# Patient Record
Sex: MALE | Race: ASIAN | NOT HISPANIC OR LATINO | URBAN - METROPOLITAN AREA
[De-identification: names, ages, dates, MRNs, and addresses within clinical notes are randomized per-mention and may not be internally consistent; named-entity substitution may affect disease eponyms.]

---

## 2024-07-15 VITALS
HEART RATE: 60 BPM | WEIGHT: 147.93 LBS | DIASTOLIC BLOOD PRESSURE: 64 MMHG | RESPIRATION RATE: 16 BRPM | TEMPERATURE: 98 F | SYSTOLIC BLOOD PRESSURE: 138 MMHG | OXYGEN SATURATION: 96 % | HEIGHT: 64.96 IN

## 2024-07-15 RX ORDER — SODIUM CHLORIDE 0.9 % (FLUSH) 0.9 %
1000 SYRINGE (ML) INJECTION
Refills: 0 | Status: DISCONTINUED | OUTPATIENT
Start: 2024-07-18 | End: 2024-07-19

## 2024-07-15 NOTE — H&P ADULT - NSICDXPASTMEDICALHX_GEN_ALL_CORE_FT
PAST MEDICAL HISTORY:  Coronary artery disease with hx of myocardial infarct w/o hx of CABG     HLD (hyperlipidemia)     HTN (hypertension)     Pacemaker

## 2024-07-15 NOTE — H&P ADULT - NSICDXPASTSURGICALHX_GEN_ALL_CORE_FT
PAST SURGICAL HISTORY:  History of cardiac pacemaker     S/P CABG (coronary artery bypass graft)

## 2024-07-15 NOTE — H&P ADULT - HISTORY OF PRESENT ILLNESS
Cardiologist: Dr. Sohan Weber  Escort:  Pharmacy:    62 y/o M with HTN, HLD, CAD s/p CABG and bradycardia s/p PPM referred by Dr. Chisholm to outpatient cardiologist for initial evaluation of chest pain and PARK. Pt was previously followed by Dr. Pineda. He reports intermittent moderate substernal chest pain described as "pressure", that occurs both at rest and during exertion, and relieves spontaneously after a few minutes. There is no radiation. There is PARK upon climbing incline but ET is limited by back pain and unsteady gait. Pt denies diaphoresis, palpitations, dizziness, syncope, edema, orthopnea. TTE 6/24/24 reveals normal LV systolic function EF 55-60%, grade 1 impaired diastolic dysfunction, mildly reduced RV function, mild MR/TR. Per MD note, NST revealed anterolateral wall ischemia. In light of CCS III anginal symptoms, abnormal NST and prior history of CABG, pt referred for cath with possible PCI if clinically indicated.     Cardiologist: Dr. Sohan Weber  Escort: Daughter   Pharmacy: [facility] ([phone])     62 y/o M with HTN, HLD, CAD s/p CABG and bradycardia s/p PPM referred by Dr. Chisholm to outpatient cardiologist for initial evaluation of chest pain and PARK. Pt was previously followed by Dr. Pineda. He reports intermittent moderate substernal chest pain described as "pressure", that occurs both at rest and during exertion, and relieves spontaneously after a few minutes. There is no radiation. There is PARK upon climbing incline but ET is limited by back pain and unsteady gait. Pt denies diaphoresis, palpitations, dizziness, syncope, edema, orthopnea. TTE 6/24/24 reveals normal LV systolic function EF 55-60%, grade 1 impaired diastolic dysfunction, mildly reduced RV function, mild MR/TR. Per MD note, NST revealed anterolateral wall ischemia. In light of CCS III anginal symptoms, abnormal NST and prior history of CABG, pt referred for cath with possible PCI if clinically indicated.     Cardiologist: Dr. Sohan Weber  Escort: Daughter   Pharmacy: [email or web address] ([phone])     64 y/o M with HTN, HLD, CAD s/p CABG and bradycardia s/p PPM referred by Dr. Chisholm to outpatient cardiologist for initial evaluation of chest pain and PARK. Pt was previously followed by Dr. Pineda. He reports intermittent moderate substernal chest pain described as "pressure", that occurs both at rest and during exertion but aggravated by exertion, and relieves spontaneously after a few minutes. There is no radiation. There is PARK upon climbing incline but ET is limited by back pain and unsteady gait. Pt denies diaphoresis, palpitations, dizziness, syncope, edema, orthopnea. TTE 6/24/24 reveals normal LV systolic function EF 55-60%, grade 1 impaired diastolic dysfunction, mildly reduced RV function, mild MR/TR. Per MD note, NST revealed anterolateral wall ischemia. In light of CCS III anginal symptoms, abnormal NST and prior history of CABG, pt referred for cath with possible PCI if clinically indicated.

## 2024-07-15 NOTE — H&P ADULT - ASSESSMENT
62 y/o M with HTN, HLD, CAD s/p CABG and bradycardia s/p PPM who in light of CCS III anginal symptoms, abnormal NST and prior history of CABG, pt referred for cath with possible PCI if clinically indicated.    		  ASA III	Mallampati class: II    Anginal Class: III    -No Known Allergies    -H/H =   -Pt denies BRBPR, hematuria, hematochezia, melena. Pt endorses compliance w/ home Aspirin, stating last dose was 7/17/24. Pt loaded w/ ASA 81 mg x1 and Plavix 600 mg x1  -BUN/Cr =   -EF 55-60% by echo. Euvolemic on exam. IV NS @ 250cc bolus followed by 75 cc/hr x 2 hrs started pre procedure    Sedation Plan:   Moderate  Patient Is Suitable Candidate For Sedation?     Yes    Risks & benefits of procedure and alternative therapy have been explained to the patient by the Interventional Cardiology Fellow including but not limited to: allergic reaction, bleeding with possible need for blood transfusion, infection, renal and vascular compromise, limb damage, arrhythmia, stroke, vessel dissection/perforation, myocardial infarction, and emergent CABG. Informed consent obtained at bedside and included in chart.

## 2024-07-18 ENCOUNTER — INPATIENT (INPATIENT)
Facility: HOSPITAL | Age: 64
LOS: 0 days | Discharge: HOME | End: 2024-07-19
Attending: INTERNAL MEDICINE | Admitting: INTERNAL MEDICINE
Payer: MEDICAID

## 2024-07-18 DIAGNOSIS — Z95.1 PRESENCE OF AORTOCORONARY BYPASS GRAFT: Chronic | ICD-10-CM

## 2024-07-18 DIAGNOSIS — Z95.0 PRESENCE OF CARDIAC PACEMAKER: Chronic | ICD-10-CM

## 2024-07-18 LAB
A1C WITH ESTIMATED AVERAGE GLUCOSE RESULT: 5.6 % — SIGNIFICANT CHANGE UP (ref 4–5.6)
ALBUMIN SERPL ELPH-MCNC: 4.2 G/DL — SIGNIFICANT CHANGE UP (ref 3.3–5)
ALP SERPL-CCNC: 46 U/L — SIGNIFICANT CHANGE UP (ref 40–120)
ALT FLD-CCNC: 24 U/L — SIGNIFICANT CHANGE UP (ref 10–45)
ANION GAP SERPL CALC-SCNC: 8 MMOL/L — SIGNIFICANT CHANGE UP (ref 5–17)
APTT BLD: 30.6 SEC — SIGNIFICANT CHANGE UP (ref 24.5–35.6)
AST SERPL-CCNC: 20 U/L — SIGNIFICANT CHANGE UP (ref 10–40)
BASE EXCESS BLDV CALC-SCNC: 1.4 MMOL/L — SIGNIFICANT CHANGE UP (ref -2–3)
BASOPHILS # BLD AUTO: 0.04 K/UL — SIGNIFICANT CHANGE UP (ref 0–0.2)
BASOPHILS NFR BLD AUTO: 0.6 % — SIGNIFICANT CHANGE UP (ref 0–2)
BILIRUB SERPL-MCNC: 0.5 MG/DL — SIGNIFICANT CHANGE UP (ref 0.2–1.2)
BLOOD GAS VENOUS - BLOOD UREA NITROGEN: 17 MG/DL — SIGNIFICANT CHANGE UP (ref 7–23)
BLOOD GAS VENOUS - CREATININE: 0.7 MG/DL — SIGNIFICANT CHANGE UP (ref 0.5–1.3)
BUN SERPL-MCNC: 16 MG/DL — SIGNIFICANT CHANGE UP (ref 7–23)
CA-I SERPL-SCNC: 1.2 MMOL/L — SIGNIFICANT CHANGE UP (ref 1.15–1.33)
CA-I SERPL-SCNC: 1.21 MMOL/L — SIGNIFICANT CHANGE UP (ref 1.15–1.33)
CALCIUM SERPL-MCNC: 9 MG/DL — SIGNIFICANT CHANGE UP (ref 8.4–10.5)
CHLORIDE BLDV-SCNC: 106 MMOL/L — SIGNIFICANT CHANGE UP (ref 96–108)
CHLORIDE SERPL-SCNC: 108 MMOL/L — SIGNIFICANT CHANGE UP (ref 96–108)
CHOLEST SERPL-MCNC: 110 MG/DL — SIGNIFICANT CHANGE UP
CK MB CFR SERPL CALC: 1.8 NG/ML — SIGNIFICANT CHANGE UP (ref 0–6.7)
CK SERPL-CCNC: 127 U/L — SIGNIFICANT CHANGE UP (ref 30–200)
CO2 BLDV-SCNC: 23.9 MMOL/L — SIGNIFICANT CHANGE UP (ref 22–26)
CO2 BLDV-SCNC: 27.9 MMOL/L — HIGH (ref 22–26)
CO2 SERPL-SCNC: 25 MMOL/L — SIGNIFICANT CHANGE UP (ref 22–31)
COHGB MFR BLDV: 1.8 % — SIGNIFICANT CHANGE UP
CREAT SERPL-MCNC: 0.74 MG/DL — SIGNIFICANT CHANGE UP (ref 0.5–1.3)
EGFR: 101 ML/MIN/1.73M2 — SIGNIFICANT CHANGE UP
EOSINOPHIL # BLD AUTO: 0.12 K/UL — SIGNIFICANT CHANGE UP (ref 0–0.5)
EOSINOPHIL NFR BLD AUTO: 1.8 % — SIGNIFICANT CHANGE UP (ref 0–6)
ESTIMATED AVERAGE GLUCOSE: 114 MG/DL — SIGNIFICANT CHANGE UP (ref 68–114)
GAS PNL BLDV: 138 MMOL/L — SIGNIFICANT CHANGE UP (ref 136–145)
GAS PNL BLDV: 140 MMOL/L — SIGNIFICANT CHANGE UP (ref 136–145)
GLUCOSE BLDV-MCNC: 100 MG/DL — HIGH (ref 70–99)
GLUCOSE BLDV-MCNC: 96 MG/DL — SIGNIFICANT CHANGE UP (ref 70–99)
GLUCOSE SERPL-MCNC: 102 MG/DL — HIGH (ref 70–99)
HCO3 BLDV-SCNC: 27 MMOL/L — SIGNIFICANT CHANGE UP (ref 22–29)
HCT VFR BLD CALC: 40.7 % — SIGNIFICANT CHANGE UP (ref 39–50)
HCT VFR BLDA CALC: 39 % — SIGNIFICANT CHANGE UP
HCT VFR BLDA CALC: 42 % — SIGNIFICANT CHANGE UP
HDLC SERPL-MCNC: 41 MG/DL — SIGNIFICANT CHANGE UP
HGB BLD CALC-MCNC: 14 G/DL — SIGNIFICANT CHANGE UP (ref 12.6–17.4)
HGB BLD-MCNC: 13.5 G/DL — SIGNIFICANT CHANGE UP (ref 13–17)
IMM GRANULOCYTES NFR BLD AUTO: 0.1 % — SIGNIFICANT CHANGE UP (ref 0–0.9)
INR BLD: 0.9 — SIGNIFICANT CHANGE UP (ref 0.85–1.18)
LACTATE BLDV-MCNC: 1.2 MMOL/L — SIGNIFICANT CHANGE UP (ref 0.5–2)
LIPID PNL WITH DIRECT LDL SERPL: 46 MG/DL — SIGNIFICANT CHANGE UP
LYMPHOCYTES # BLD AUTO: 1.79 K/UL — SIGNIFICANT CHANGE UP (ref 1–3.3)
LYMPHOCYTES # BLD AUTO: 26.5 % — SIGNIFICANT CHANGE UP (ref 13–44)
MAGNESIUM SERPL-MCNC: 2.2 MG/DL — SIGNIFICANT CHANGE UP (ref 1.6–2.6)
MCHC RBC-ENTMCNC: 30.9 PG — SIGNIFICANT CHANGE UP (ref 27–34)
MCHC RBC-ENTMCNC: 33.2 GM/DL — SIGNIFICANT CHANGE UP (ref 32–36)
MCV RBC AUTO: 93.1 FL — SIGNIFICANT CHANGE UP (ref 80–100)
METHGB MFR BLDV: 1.1 % — SIGNIFICANT CHANGE UP
MONOCYTES # BLD AUTO: 0.56 K/UL — SIGNIFICANT CHANGE UP (ref 0–0.9)
MONOCYTES NFR BLD AUTO: 8.3 % — SIGNIFICANT CHANGE UP (ref 2–14)
NEUTROPHILS # BLD AUTO: 4.23 K/UL — SIGNIFICANT CHANGE UP (ref 1.8–7.4)
NEUTROPHILS NFR BLD AUTO: 62.7 % — SIGNIFICANT CHANGE UP (ref 43–77)
NON HDL CHOLESTEROL: 69 MG/DL — SIGNIFICANT CHANGE UP
NRBC # BLD: 0 /100 WBCS — SIGNIFICANT CHANGE UP (ref 0–0)
PCO2 BLDV: 43 MMHG — SIGNIFICANT CHANGE UP (ref 42–55)
PCO2 BLDV: 48 MMHG — SIGNIFICANT CHANGE UP (ref 42–55)
PH BLDV: 7.37 — SIGNIFICANT CHANGE UP (ref 7.32–7.43)
PH BLDV: 7.4 — SIGNIFICANT CHANGE UP (ref 7.32–7.43)
PLATELET # BLD AUTO: 173 K/UL — SIGNIFICANT CHANGE UP (ref 150–400)
PO2 BLDV: 47 MMHG — HIGH (ref 25–45)
POTASSIUM BLDV-SCNC: 4.3 MMOL/L — SIGNIFICANT CHANGE UP (ref 3.5–5.1)
POTASSIUM BLDV-SCNC: 4.4 MMOL/L — SIGNIFICANT CHANGE UP (ref 3.5–5.1)
POTASSIUM SERPL-MCNC: 4 MMOL/L — SIGNIFICANT CHANGE UP (ref 3.5–5.3)
POTASSIUM SERPL-SCNC: 4 MMOL/L — SIGNIFICANT CHANGE UP (ref 3.5–5.3)
PROT SERPL-MCNC: 7.2 G/DL — SIGNIFICANT CHANGE UP (ref 6–8.3)
PROTHROM AB SERPL-ACNC: 10.3 SEC — SIGNIFICANT CHANGE UP (ref 9.5–13)
RBC # BLD: 4.37 M/UL — SIGNIFICANT CHANGE UP (ref 4.2–5.8)
RBC # FLD: 13 % — SIGNIFICANT CHANGE UP (ref 10.3–14.5)
SAO2 % BLDV: 81 % — SIGNIFICANT CHANGE UP (ref 67–88)
SODIUM SERPL-SCNC: 141 MMOL/L — SIGNIFICANT CHANGE UP (ref 135–145)
TRIGL SERPL-MCNC: 134 MG/DL — SIGNIFICANT CHANGE UP
WBC # BLD: 6.75 K/UL — SIGNIFICANT CHANGE UP (ref 3.8–10.5)
WBC # FLD AUTO: 6.75 K/UL — SIGNIFICANT CHANGE UP (ref 3.8–10.5)

## 2024-07-18 PROCEDURE — 93010 ELECTROCARDIOGRAM REPORT: CPT

## 2024-07-18 RX ORDER — ALLOPURINOL 300 MG/1
50 TABLET ORAL EVERY 8 HOURS
Refills: 0 | Status: DISCONTINUED | OUTPATIENT
Start: 2024-07-18 | End: 2024-07-19

## 2024-07-18 RX ORDER — SODIUM CHLORIDE 0.9 % (FLUSH) 0.9 %
250 SYRINGE (ML) INJECTION ONCE
Refills: 0 | Status: COMPLETED | OUTPATIENT
Start: 2024-07-18 | End: 2024-07-18

## 2024-07-18 RX ORDER — LOSARTAN POTASSIUM 100 MG/1
1 TABLET, FILM COATED ORAL
Refills: 0 | DISCHARGE

## 2024-07-18 RX ORDER — ATORVASTATIN CALCIUM 20 MG/1
80 TABLET, FILM COATED ORAL AT BEDTIME
Refills: 0 | Status: DISCONTINUED | OUTPATIENT
Start: 2024-07-18 | End: 2024-07-19

## 2024-07-18 RX ORDER — ISOSORBIDE MONONITRATE 30 MG/1
30 TABLET, EXTENDED RELEASE ORAL DAILY
Refills: 0 | Status: DISCONTINUED | OUTPATIENT
Start: 2024-07-18 | End: 2024-07-19

## 2024-07-18 RX ORDER — NITROGLYCERIN 2.5 MG
1 CAPSULE, EXTENDED RELEASE ORAL
Refills: 0 | DISCHARGE

## 2024-07-18 RX ORDER — TAMSULOSIN HYDROCHLORIDE 0.4 MG/1
1 CAPSULE ORAL
Refills: 0 | DISCHARGE

## 2024-07-18 RX ORDER — METOPROLOL TARTRATE 50 MG
25 TABLET ORAL DAILY
Refills: 0 | Status: DISCONTINUED | OUTPATIENT
Start: 2024-07-18 | End: 2024-07-19

## 2024-07-18 RX ORDER — SODIUM CHLORIDE 0.9 % (FLUSH) 0.9 %
1000 SYRINGE (ML) INJECTION
Refills: 0 | Status: DISCONTINUED | OUTPATIENT
Start: 2024-07-18 | End: 2024-07-19

## 2024-07-18 RX ORDER — ASPIRIN 325 MG/1
81 TABLET, FILM COATED ORAL DAILY
Refills: 0 | Status: DISCONTINUED | OUTPATIENT
Start: 2024-07-18 | End: 2024-07-19

## 2024-07-18 RX ORDER — CLOPIDOGREL BISULFATE 75 MG/1
600 TABLET, FILM COATED ORAL ONCE
Refills: 0 | Status: COMPLETED | OUTPATIENT
Start: 2024-07-18 | End: 2024-07-18

## 2024-07-18 RX ORDER — ALLOPURINOL 300 MG/1
0.5 TABLET ORAL
Refills: 0 | DISCHARGE

## 2024-07-18 RX ORDER — LOSARTAN POTASSIUM 100 MG/1
25 TABLET, FILM COATED ORAL DAILY
Refills: 0 | Status: DISCONTINUED | OUTPATIENT
Start: 2024-07-18 | End: 2024-07-19

## 2024-07-18 RX ORDER — METOPROLOL TARTRATE 50 MG
1 TABLET ORAL
Refills: 0 | DISCHARGE

## 2024-07-18 RX ORDER — ISOSORBIDE MONONITRATE 30 MG/1
1 TABLET, EXTENDED RELEASE ORAL
Refills: 0 | DISCHARGE

## 2024-07-18 RX ORDER — ROSUVASTATIN CALCIUM 20 MG/1
1 TABLET ORAL
Refills: 0 | DISCHARGE

## 2024-07-18 RX ORDER — ASPIRIN 325 MG/1
81 TABLET, FILM COATED ORAL ONCE
Refills: 0 | Status: COMPLETED | OUTPATIENT
Start: 2024-07-18 | End: 2024-07-18

## 2024-07-18 RX ORDER — CLOPIDOGREL BISULFATE 75 MG/1
75 TABLET, FILM COATED ORAL DAILY
Refills: 0 | Status: DISCONTINUED | OUTPATIENT
Start: 2024-07-19 | End: 2024-07-19

## 2024-07-18 RX ORDER — RANOLAZINE 500 MG/1
500 TABLET, EXTENDED RELEASE ORAL
Refills: 0 | DISCHARGE

## 2024-07-18 RX ADMIN — ASPIRIN 81 MILLIGRAM(S): 325 TABLET, FILM COATED ORAL at 07:59

## 2024-07-18 RX ADMIN — ASPIRIN 81 MILLIGRAM(S): 325 TABLET, FILM COATED ORAL at 17:59

## 2024-07-18 RX ADMIN — Medication 75 MILLILITER(S): at 07:59

## 2024-07-18 RX ADMIN — Medication 1000 MILLILITER(S): at 07:59

## 2024-07-18 RX ADMIN — ALLOPURINOL 50 MILLIGRAM(S): 300 TABLET ORAL at 17:59

## 2024-07-18 RX ADMIN — ATORVASTATIN CALCIUM 80 MILLIGRAM(S): 20 TABLET, FILM COATED ORAL at 21:31

## 2024-07-18 RX ADMIN — ISOSORBIDE MONONITRATE 30 MILLIGRAM(S): 30 TABLET, EXTENDED RELEASE ORAL at 17:59

## 2024-07-18 RX ADMIN — CLOPIDOGREL BISULFATE 600 MILLIGRAM(S): 75 TABLET, FILM COATED ORAL at 07:59

## 2024-07-18 RX ADMIN — ALLOPURINOL 50 MILLIGRAM(S): 300 TABLET ORAL at 21:32

## 2024-07-18 RX ADMIN — Medication 210 MILLILITER(S): at 11:43

## 2024-07-18 NOTE — DISCHARGE NOTE PROVIDER - ATTENDING DISCHARGE PHYSICAL EXAMINATION:
CAD  s/p [facility] 7/18/24: dLM 50%, pLAD 70-80%, Ramus 80%m oLCx 90%, mLCx 80%, eAGL68-32%, mRCA 30-40%, LIMA-LAD patent, SVG-RI patent s/p IVUS guided DESx3 dLM, pLCx and mLCx. No EDP. EF 55-60% , Access - RFA Perclose. Pt will c/w ASA 81mg qd, Plavix 75mg qd, Crestor 20mg qd. LDL 46.   Follow up with cardiologist Dr. Weber in 1-2 weeks.

## 2024-07-18 NOTE — DISCHARGE NOTE PROVIDER - CARE PROVIDER_API CALL
Norma Weber  Cardiology  [address] [place] [address]  Phone: [phone]  Fax: [phone]  Established Patient  Follow Up Time: 1 week   Norma Weber  Cardiologist  [address] [place] [address]  Phone: [phone]  Fax: (   )    -  Established Patient  Follow Up Time: 1 week

## 2024-07-18 NOTE — DISCHARGE NOTE PROVIDER - NSDCCPCAREPLAN_GEN_ALL_CORE_FT
PRINCIPAL DISCHARGE DIAGNOSIS  Diagnosis: CAD (coronary artery disease)  Assessment and Plan of Treatment: You were found to have blockages in the arteries of your heart, also known as Coronary Artery Disease. You underwent a cardiac catheterization on 7/18/24 and received a stent to the Left main, left Circumflex arteries.  PLEASE CONTINUE ASPIRIN 81MG DAILY AND PLAVIX 75MG DAILY. DO NOT STOP THESE MEDICATIONS FOR ANY REASON AS THEY ARE KEEPING YOUR STENT OPEN AND PREVENTING A HEART ATTACK.   We have provided you with a prescription for cardiac rehab which is medically supervised exercise program for your heart and has been shown to improve the quantity and quality of life of people with heart disease like yours. You should attend cardiac rehab 3 times per week for 12 weeks. We have provided you with a list of nearby facilities. Please call your insurance carrier to determine which of these facilities are covered under your plan.  Avoid strenuous activity or heavy lifting anything more than 5lbs for the next five days. Do not take a bath or swim for the next five days; you may shower. For any bleeding or hematoma formation (hardened blood collection under the skin) at the access site of your right radial access please hold pressure and go to the emergency room. Please follow up with Dr. Weber in 1-2 weeks. For recurrent chest pain, please call your doctor or go to the emergency room.        SECONDARY DISCHARGE DIAGNOSES  Diagnosis: HTN (hypertension)  Assessment and Plan of Treatment: Please continue Imdur 30mg, Losartan 25mg, metoprolol succinate 25mg as listed to keep your blood pressure controlled. For blood pressure that is too high or too low please see your doctor or go to the emergency room as necessary.      Diagnosis: HLD (hyperlipidemia)  Assessment and Plan of Treatment: Please continue Crestor 20mg at bedtime to keep your cholesterol low. High cholesterol contributes to heart disease.       PRINCIPAL DISCHARGE DIAGNOSIS  Diagnosis: CAD (coronary artery disease)  Assessment and Plan of Treatment: You were found to have blockages in the arteries of your heart, also known as Coronary Artery Disease. You underwent a cardiac catheterization on 7/18/24 and received 3 total cardiac stents to the Left Main and Left Circumflex Arteries. You will need to take antiplatelet medications Asprin and Plavix daily to help the cardiac stent keep open. DO NOT STOP taking these medications unless directed by your cardiologist as this can be LIFE THREATENING and lead to closing up of the cardiac stent and lead to a heart attack!  The possible side effects of these medications include increased risk of bleeding and easy bruising. However, the benefits of preventing stent closure are greater than this risk, so you are advised to take these medications especially after stent placement.      SECONDARY DISCHARGE DIAGNOSES  Diagnosis: HLD (hyperlipidemia)  Assessment and Plan of Treatment: Please continue Crestor 20mg at bedtime to keep your cholesterol low. High cholesterol contributes to heart disease.    Diagnosis: Encounter for cardiac rehabilitation  Assessment and Plan of Treatment: It is recommended for you to go to cardiac rehabilitation, which is outpatient physical therapy tailored to improve the heart.  It has been shown to improve the quantity and quality of life of people with heart disease like yours. You should attend cardiac rehab 3 times per week for 12 weeks.  You were provided a prescription a list of nearby facilities. Please call your insurance carrier to determine which of these facilities are covered under your plan.   -Please bring this prescription with you to your follow up appointment with your cardiologist who can then further assist you to enroll into a cardiac rehab program.

## 2024-07-18 NOTE — DISCHARGE NOTE PROVIDER - PROVIDER TOKENS
PROVIDER:[TOKEN:[[id number]],FOLLOWUP:[1 week],ESTABLISHEDPATIENT:[T]] FREE:[LAST:[Weber],FIRST:[JanieKaushikArmando],PHONE:[phone]],FAX:[(   )    -],ADDRESS:[Cardiologist  [address] [place] [address]],FOLLOWUP:[1 week],ESTABLISHEDPATIENT:[T]]

## 2024-07-18 NOTE — DISCHARGE NOTE PROVIDER - HOSPITAL COURSE
64 y/o M with HTN, HLD, CAD s/p CABG and bradycardia s/p PPM referred by Dr. Chisholm to outpatient cardiologist for initial evaluation of chest pain and PARK. Pt was previously followed by Dr. Pineda. He reports intermittent moderate substernal chest pain described as "pressure", that occurs both at rest and during exertion but aggravated by exertion, and relieves spontaneously after a few minutes. There is no radiation. There is PARK upon climbing incline but ET is limited by back pain and unsteady gait. Pt denies diaphoresis, palpitations, dizziness, syncope, edema, orthopnea. TTE 6/24/24 reveals normal LV systolic function EF 55-60%, grade 1 impaired diastolic dysfunction, mildly reduced RV function, mild MR/TR. Per MD note, NST revealed anterolateral wall ischemia. In light of CCS III anginal symptoms, abnormal NST and prior history of CABG, pt referred for cath with possible PCI if clinically indicated.    Patient is now s/p [facility] 7/18/24: dLM 50%, pLAD 70-80%, Ramus 80%m oLCx 90%, mLCx 80%, mHKO04-99%, mRCA 30-40%, LIMA-LAD patent, SVG-RI patent s/p IVUS guided DESx3 dLM, pLCx and mLCx. No EDP. EF 55-60% , Access - RFA PC.                Cardiac Rehab Indications (STEMI/NSTEMI/ACS/Unstable Angina/CHF/Chronic Stable Angina/Heart Surgery (CABG,Valve)/Post PCI):            *Education on benefits of Cardiac Rehab provided to patient: Yes         *Referral and Prescription Given for Cardiac Rehab: Yes         *Pt given list of locations & instructed to contact their insurance company to review list of participating providers. Yes          *Pt instructed to bring Cardiac Rehab prescription with them to Cardiology Follow up appointment for assistance with enrollment: Yes         *Pt discharge copies detail cardiovascular history, medications, testing/treatments OR pt has created a patient portal account and instructed to provider their records at their 1st appointment: Yes    -Beta Blocker Prescribed: Yes   -ACE-I/ARB Prescribed: Yes  -Statin Prescribed (STEMI/NSTEMI/ACS/UA &/OR Post PCI this admission):  Yes  -DAPT Post PCI: Prescriptions for Aspirin/Plavix e-prescribed to patient's pharmacy: Yes  -CVD ( (STEMI/NSTEMI/ACS/UA &/OR Post PCI this admission): GLP-1 receptor agonist, SGLT2 inhibitor meds discussed w/ patients and encouraged to discuss further with PMD or endo at next visit: Yes    Pt is asymptomatic at this time and denies chest pain, SOB, PARK, palpitations, dizziness, LOC, N/V, diaphoresis, orthopnea/PND, and leg swelling. Pt able to ambulate and void without complication. VSS. Labs and telemetry reviewed. Right radial access site stable and dressing C/D/I.  Pt is a candidate for discharge per Dr. Rangel. Pt given appropriate discharge instructions, pt states they have an appropriate amount of their previous home meds unchanged from this visit at home, and any new medications were sent to their pharmacy. Pt instructed to f/u with Dr. Weber in 1-2 weeks.      DISCHARGE MEDS:   -ASA 81mg   -plavix 75mg   -Crestor 20mg    64 y/o M with HTN, HLD, CAD s/p CABG and bradycardia s/p PPM, referred to outpatient cardiologist for initial evaluation of chest pain and PARK. He reports intermittent moderate substernal chest pain described as "pressure", that occurs both at rest and during exertion but aggravated by exertion, and relieves spontaneously after a few minutes. Reports PARK upon climbing incline, but ET is limited by back pain and unsteady gait. TTE 6/24/24 reveals normal LV systolic function EF 55-60%, grade 1 impaired diastolic dysfunction, mildly reduced RV function, mild MR/TR. Per MD note, NST revealed anterolateral wall ischemia. In light of CCS III anginal symptoms, abnormal NST and prior history of CABG, pt referred for cardiac cath.    Patient is now s/p [facility] 7/18/24: dLM 50%, pLAD 70-80%, Ramus 80%m oLCx 90%, mLCx 80%, tUNL23-12%, mRCA 30-40%, LIMA-LAD patent, SVG-RI patent s/p IVUS guided DESx3 dLM, pLCx and mLCx. No EDP. EF 55-60% , Access - RFA Perclose. Pt will c/w ASA 81mg qd, Plavix 75mg qd, Crestor 20mg qd. LDL 46.     Pt admitted overnight for observation and telemetry monitoring. Pt seen and examined at bedside on day of discharge without any complaints or events overnight. VSS, labs and telemetry reviewed and pt stable for discharge. Pt has received appropriate discharge instructions, including medication regimen, access site management and follow up with cardiologist Dr. Weber in 1-2 weeks.    Referred for Cardiac Rehab (CAD Post PCI): Education on benefits of Cardiac Rehab provided to patient. Referral and Prescription Given for Cardiac Rehab. Pt given list of locations & instructed to contact their insurance company to review list of participating providers. Pt instructed to bring Cardiac Rehab prescription with them to Cardiology Follow up appointment for assistance with enrollment.  GLP-1 receptor agonist/SGLT2 inhibitor meds discussed w/ patients and encouraged to discuss further with PMD or Endocrinologist at next visit.    Pt discharge copies detail cardiovascular history, medications, testing/treatments, OR patient has created a patient portal account and instructed to provide their records at their 1st appointment.

## 2024-07-18 NOTE — DISCHARGE NOTE PROVIDER - NSDCMRMEDTOKEN_GEN_ALL_CORE_FT
allopurinol 100 mg oral tablet: 0.5 orally every 8 hours  aspirin 81 mg oral capsule: 1 cap(s) orally once a day  Crestor 20 mg oral tablet: 1 tab(s) orally once a day  Imdur 30 mg oral tablet, extended release: 1 tab(s) orally once a day  losartan 25 mg oral tablet: 1 tab(s) orally once a day  metoprolol succinate 25 mg oral tablet, extended release: 1 tab(s) orally once a day  nitroglycerin 0.4 mg sublingual tablet: 1 tab(s) sublingually as needed for  chest pain   allopurinol 100 mg oral tablet: 0.5 orally every 8 hours  aspirin 81 mg oral delayed release tablet: 1 tab(s) orally once a day  Cardiac rehabilitation: 3 times a week for 12 weeks. Dx CAD s/p PCI. Outpatient cardiologist Dr Norma Weber  clopidogrel 75 mg oral tablet: 1 tab(s) orally once a day  Crestor 20 mg oral tablet: 1 tab(s) orally once a day  Imdur 30 mg oral tablet, extended release: 1 tab(s) orally once a day  losartan 25 mg oral tablet: 1 tab(s) orally once a day  metoprolol succinate 25 mg oral tablet, extended release: 1 tab(s) orally once a day  nitroglycerin 0.4 mg sublingual tablet: 1 tab(s) sublingually as needed for  chest pain

## 2024-07-18 NOTE — DISCHARGE NOTE PROVIDER - NSDCFUADDINST_GEN_ALL_CORE_FT
ACTIVITY:     Do not drive or operate hazardous machinery for 24 hours.                           Limit your physical activities for 24 hours.                           Do not engage in in sports, heavy work or heavy lifting for 72 hours.           DIET:             You may resume your regular diet.                           Drinking extra fluids (water, juice) is encouraged.                           Abstain from alcohol for 24 hours.           HYGIENE:     Shower and take off the puncture site dressing the next morning.                           If you received a "closure device" in your groin, you may shower the next day, but not take a bath, hot tub or swim for 5  days.           PAIN MEDICATION:   A mild pain at the puncture site is not unusual.                                           You may take Tylenol 1-2 tabs every 4-6 hours as needed, for one day.                                           If the pain persists contact the office at [phone]           SPECIAL INSTRUCTIONS:     Signs and symptoms to look out for:              1. Waldemar bleeding from the puncture site is an emergency.               Put direct pressure on the site and go directly to your local Emergency   Room for treatment.              2. Bleeding under the skin may also occur and a small "black and blue may be expected.            If there appears to be an expanding mass or swelling around the puncture site, apply manual compression and go             immediately to your local Emergency Room for treatment.              3. If your foot/leg or hand/arm (puncture site) becomes cool or blue and/or you are unable to move it, this must be treated as an   emergency.            go directly to your local emergency room for treatment.              4. Excessive puncture site pain is abnormal and should be assessed.             5.  Look out for signs of infection in the puncture site: fever, red streaking of the leg, discharge, pain.             6.  Lack of adequate urine output, provided you are drinking enough fluids, may be cause for concern, notify us if you think this is the case.        - Do NOT drive or operate hazardous machinery for 24 hours. Limit your physical activity for 24-48 hours. Do NOT engage in sports, heavy work or heavy lifting more than 5 lbs for 5 days.   - You MAY shower and wash the area gently with soap and water. BUT no TUB BATHS, HOT TUBS OR SWIMMING FOR 5 DAYS.  Do not keep the area covered. Do not put any lotions, creams, or ointments on the site.  - Your procedure was done through your right groin. If you observe flank bleeding from the puncture site, it is an emergency. Please put direct pressure on the site and go directly to the ER. Bleeding under the skin may also occur and a small "black and blue" may be expected. If the area appears to be expanding or swelling around the puncture site, apply manual compression and go immediately to the nearest ER. If your leg/foot becomes cool or blue and/or you are unable to move it, this must be treated as an emergency, go directly to the nearest ER. Look for signs of infection in the groin: fever, red streaking of the leg, obvious pus formation and pain.  -If you have any issues or concerns regarding your access site, you may call [facility] Interventional Cardiology at [phone].

## 2024-07-19 VITALS
HEART RATE: 60 BPM | DIASTOLIC BLOOD PRESSURE: 61 MMHG | RESPIRATION RATE: 18 BRPM | SYSTOLIC BLOOD PRESSURE: 132 MMHG | OXYGEN SATURATION: 94 %

## 2024-07-19 LAB
ANION GAP SERPL CALC-SCNC: 9 MMOL/L — SIGNIFICANT CHANGE UP (ref 5–17)
BUN SERPL-MCNC: 16 MG/DL — SIGNIFICANT CHANGE UP (ref 7–23)
CALCIUM SERPL-MCNC: 8.5 MG/DL — SIGNIFICANT CHANGE UP (ref 8.4–10.5)
CHLORIDE SERPL-SCNC: 104 MMOL/L — SIGNIFICANT CHANGE UP (ref 96–108)
CO2 SERPL-SCNC: 24 MMOL/L — SIGNIFICANT CHANGE UP (ref 22–31)
CREAT SERPL-MCNC: 0.71 MG/DL — SIGNIFICANT CHANGE UP (ref 0.5–1.3)
EGFR: 102 ML/MIN/1.73M2 — SIGNIFICANT CHANGE UP
GLUCOSE SERPL-MCNC: 106 MG/DL — HIGH (ref 70–99)
HCT VFR BLD CALC: 38.3 % — LOW (ref 39–50)
HGB BLD-MCNC: 12.8 G/DL — LOW (ref 13–17)
ISTAT ACTK (ACTIVATED CLOTTING TIME KAOLIN): 195 SEC — HIGH (ref 74–137)
ISTAT ACTK (ACTIVATED CLOTTING TIME KAOLIN): 287 SEC — HIGH (ref 74–137)
ISTAT ACTK (ACTIVATED CLOTTING TIME KAOLIN): 299 SEC — HIGH (ref 74–137)
MAGNESIUM SERPL-MCNC: 2.1 MG/DL — SIGNIFICANT CHANGE UP (ref 1.6–2.6)
MCHC RBC-ENTMCNC: 30.6 PG — SIGNIFICANT CHANGE UP (ref 27–34)
MCHC RBC-ENTMCNC: 33.4 GM/DL — SIGNIFICANT CHANGE UP (ref 32–36)
MCV RBC AUTO: 91.6 FL — SIGNIFICANT CHANGE UP (ref 80–100)
NRBC # BLD: 0 /100 WBCS — SIGNIFICANT CHANGE UP (ref 0–0)
PLATELET # BLD AUTO: 156 K/UL — SIGNIFICANT CHANGE UP (ref 150–400)
POTASSIUM SERPL-MCNC: 3.9 MMOL/L — SIGNIFICANT CHANGE UP (ref 3.5–5.3)
POTASSIUM SERPL-SCNC: 3.9 MMOL/L — SIGNIFICANT CHANGE UP (ref 3.5–5.3)
RBC # BLD: 4.18 M/UL — LOW (ref 4.2–5.8)
RBC # FLD: 12.7 % — SIGNIFICANT CHANGE UP (ref 10.3–14.5)
SODIUM SERPL-SCNC: 137 MMOL/L — SIGNIFICANT CHANGE UP (ref 135–145)
WBC # BLD: 9.51 K/UL — SIGNIFICANT CHANGE UP (ref 3.8–10.5)
WBC # FLD AUTO: 9.51 K/UL — SIGNIFICANT CHANGE UP (ref 3.8–10.5)

## 2024-07-19 PROCEDURE — 99239 HOSP IP/OBS DSCHRG MGMT >30: CPT

## 2024-07-19 RX ORDER — POTASSIUM CHLORIDE 600 MG/1
20 TABLET, FILM COATED, EXTENDED RELEASE ORAL ONCE
Refills: 0 | Status: COMPLETED | OUTPATIENT
Start: 2024-07-19 | End: 2024-07-19

## 2024-07-19 RX ORDER — CLOPIDOGREL BISULFATE 75 MG/1
1 TABLET, FILM COATED ORAL
Qty: 30 | Refills: 11
Start: 2024-07-19 | End: 2025-07-13

## 2024-07-19 RX ORDER — ASPIRIN 325 MG/1
1 TABLET, FILM COATED ORAL
Qty: 30 | Refills: 11
Start: 2024-07-19 | End: 2025-07-13

## 2024-07-19 RX ORDER — ASPIRIN 325 MG/1
1 TABLET, FILM COATED ORAL
Refills: 0 | DISCHARGE

## 2024-07-19 RX ADMIN — ISOSORBIDE MONONITRATE 30 MILLIGRAM(S): 30 TABLET, EXTENDED RELEASE ORAL at 09:50

## 2024-07-19 RX ADMIN — CLOPIDOGREL BISULFATE 75 MILLIGRAM(S): 75 TABLET, FILM COATED ORAL at 09:50

## 2024-07-19 RX ADMIN — Medication 25 MILLIGRAM(S): at 05:28

## 2024-07-19 RX ADMIN — ASPIRIN 81 MILLIGRAM(S): 325 TABLET, FILM COATED ORAL at 09:50

## 2024-07-19 RX ADMIN — ALLOPURINOL 50 MILLIGRAM(S): 300 TABLET ORAL at 05:28

## 2024-07-19 RX ADMIN — LOSARTAN POTASSIUM 25 MILLIGRAM(S): 100 TABLET, FILM COATED ORAL at 05:28

## 2024-07-19 RX ADMIN — POTASSIUM CHLORIDE 20 MILLIEQUIVALENT(S): 600 TABLET, FILM COATED, EXTENDED RELEASE ORAL at 09:50

## 2024-07-19 NOTE — DISCHARGE NOTE NURSING/CASE MANAGEMENT/SOCIAL WORK - NSDCPEFALRISK_GEN_ALL_CORE
For information on Fall & Injury Prevention, visit: [email or web address] OR  [email or web address] OR  https://www.cdc.gov/steadi/patient.html

## 2024-07-19 NOTE — DISCHARGE NOTE NURSING/CASE MANAGEMENT/SOCIAL WORK - PATIENT PORTAL LINK FT
You can access the FollowMyHealth Patient Portal offered by [facility] by registering at the following website: [email or web address]. By joining [employer]’s FollowMyHealth portal, you will also be able to view your health information using other applications (apps) compatible with our system.

## 2024-08-22 PROCEDURE — 85347 COAGULATION TIME ACTIVATED: CPT

## 2024-08-22 PROCEDURE — 93005 ELECTROCARDIOGRAM TRACING: CPT

## 2024-08-22 PROCEDURE — 85027 COMPLETE CBC AUTOMATED: CPT

## 2024-08-22 PROCEDURE — 82803 BLOOD GASES ANY COMBINATION: CPT

## 2024-08-22 PROCEDURE — C1760: CPT

## 2024-08-22 PROCEDURE — C1753: CPT

## 2024-08-22 PROCEDURE — 85730 THROMBOPLASTIN TIME PARTIAL: CPT

## 2024-08-22 PROCEDURE — C1769: CPT

## 2024-08-22 PROCEDURE — 85610 PROTHROMBIN TIME: CPT

## 2024-08-22 PROCEDURE — 36415 COLL VENOUS BLD VENIPUNCTURE: CPT

## 2024-08-22 PROCEDURE — 80061 LIPID PANEL: CPT

## 2024-08-22 PROCEDURE — 83036 HEMOGLOBIN GLYCOSYLATED A1C: CPT

## 2024-08-22 PROCEDURE — 82553 CREATINE MB FRACTION: CPT

## 2024-08-22 PROCEDURE — C1725: CPT

## 2024-08-22 PROCEDURE — C1874: CPT

## 2024-08-22 PROCEDURE — C1894: CPT

## 2024-08-22 PROCEDURE — C1887: CPT

## 2024-08-22 PROCEDURE — 85025 COMPLETE CBC W/AUTO DIFF WBC: CPT

## 2024-08-22 PROCEDURE — 80048 BASIC METABOLIC PNL TOTAL CA: CPT

## 2024-08-22 PROCEDURE — 80053 COMPREHEN METABOLIC PANEL: CPT

## 2024-08-22 PROCEDURE — 83735 ASSAY OF MAGNESIUM: CPT

## 2024-08-22 PROCEDURE — 82550 ASSAY OF CK (CPK): CPT

## 2025-01-09 ENCOUNTER — NON-APPOINTMENT (OUTPATIENT)
Age: 65
End: 2025-01-09

## 2025-01-09 PROBLEM — Z00.00 ENCOUNTER FOR PREVENTIVE HEALTH EXAMINATION: Status: ACTIVE | Noted: 2025-01-09

## 2025-01-15 NOTE — PATIENT PROFILE ADULT - PATIENT/CAREGIVER ACCEPTED INTERPRETER SERVICES
Initiate Treatment: Triamcinolone 0.025% to apply to the arm pit twice daily x 1-2 weeks - if no improvement in dryness or flakiness discontinue Render In Strict Bullet Format?: No Detail Level: Zone yes Initiate Treatment: triamcinolone 0.025% cream to axilla 1-2x/day for 1-2 weeks